# Patient Record
Sex: MALE | Race: BLACK OR AFRICAN AMERICAN | Employment: UNEMPLOYED | URBAN - METROPOLITAN AREA
[De-identification: names, ages, dates, MRNs, and addresses within clinical notes are randomized per-mention and may not be internally consistent; named-entity substitution may affect disease eponyms.]

---

## 2017-09-20 ENCOUNTER — HOSPITAL ENCOUNTER (EMERGENCY)
Age: 52
Discharge: HOME | End: 2017-09-20
Attending: EMERGENCY MEDICINE
Payer: MEDICAID

## 2017-09-20 ENCOUNTER — APPOINTMENT (OUTPATIENT)
Dept: GENERAL RADIOLOGY | Age: 52
End: 2017-09-20
Attending: EMERGENCY MEDICINE
Payer: MEDICAID

## 2017-09-20 VITALS
HEART RATE: 73 BPM | HEIGHT: 68 IN | DIASTOLIC BLOOD PRESSURE: 91 MMHG | SYSTOLIC BLOOD PRESSURE: 139 MMHG | RESPIRATION RATE: 16 BRPM | WEIGHT: 155 LBS | TEMPERATURE: 97.4 F | OXYGEN SATURATION: 98 % | BODY MASS INDEX: 23.49 KG/M2

## 2017-09-20 DIAGNOSIS — J06.9 VIRAL URI: ICD-10-CM

## 2017-09-20 DIAGNOSIS — J20.8 VIRAL BRONCHITIS: Primary | ICD-10-CM

## 2017-09-20 DIAGNOSIS — F17.200 SMOKER: ICD-10-CM

## 2017-09-20 DIAGNOSIS — R05.8 NON-PRODUCTIVE COUGH: ICD-10-CM

## 2017-09-20 LAB
ALBUMIN SERPL-MCNC: 4.3 G/DL (ref 3.4–5)
ALBUMIN/GLOB SERPL: 1 {RATIO} (ref 0.8–1.7)
ALP SERPL-CCNC: 59 U/L (ref 45–117)
ALT SERPL-CCNC: 82 U/L (ref 16–61)
ANION GAP SERPL CALC-SCNC: 9 MMOL/L (ref 3–18)
AST SERPL-CCNC: 66 U/L (ref 15–37)
ATRIAL RATE: 74 BPM
BASOPHILS # BLD: 0 K/UL (ref 0–0.06)
BASOPHILS NFR BLD: 0 % (ref 0–2)
BILIRUB DIRECT SERPL-MCNC: 0.2 MG/DL (ref 0–0.2)
BILIRUB SERPL-MCNC: 0.5 MG/DL (ref 0.2–1)
BUN SERPL-MCNC: 16 MG/DL (ref 7–18)
BUN/CREAT SERPL: 17 (ref 12–20)
CALCIUM SERPL-MCNC: 9.7 MG/DL (ref 8.5–10.1)
CALCULATED P AXIS, ECG09: 50 DEGREES
CALCULATED R AXIS, ECG10: -24 DEGREES
CALCULATED T AXIS, ECG11: -9 DEGREES
CHLORIDE SERPL-SCNC: 102 MMOL/L (ref 100–108)
CO2 SERPL-SCNC: 29 MMOL/L (ref 21–32)
CREAT SERPL-MCNC: 0.96 MG/DL (ref 0.6–1.3)
DIAGNOSIS, 93000: NORMAL
DIFFERENTIAL METHOD BLD: ABNORMAL
EOSINOPHIL # BLD: 0.4 K/UL (ref 0–0.4)
EOSINOPHIL NFR BLD: 7 % (ref 0–5)
ERYTHROCYTE [DISTWIDTH] IN BLOOD BY AUTOMATED COUNT: 13.5 % (ref 11.6–14.5)
GLOBULIN SER CALC-MCNC: 4.2 G/DL (ref 2–4)
GLUCOSE SERPL-MCNC: 108 MG/DL (ref 74–99)
HCT VFR BLD AUTO: 45.4 % (ref 36–48)
HGB BLD-MCNC: 16 G/DL (ref 13–16)
LIPASE SERPL-CCNC: 254 U/L (ref 73–393)
LYMPHOCYTES # BLD: 1.8 K/UL (ref 0.9–3.6)
LYMPHOCYTES NFR BLD: 31 % (ref 21–52)
MCH RBC QN AUTO: 34.3 PG (ref 24–34)
MCHC RBC AUTO-ENTMCNC: 35.2 G/DL (ref 31–37)
MCV RBC AUTO: 97.4 FL (ref 74–97)
MONOCYTES # BLD: 0.5 K/UL (ref 0.05–1.2)
MONOCYTES NFR BLD: 8 % (ref 3–10)
NEUTS SEG # BLD: 3.2 K/UL (ref 1.8–8)
NEUTS SEG NFR BLD: 54 % (ref 40–73)
P-R INTERVAL, ECG05: 190 MS
PLATELET # BLD AUTO: 217 K/UL (ref 135–420)
PMV BLD AUTO: 9.7 FL (ref 9.2–11.8)
POTASSIUM SERPL-SCNC: 4.3 MMOL/L (ref 3.5–5.5)
PROT SERPL-MCNC: 8.5 G/DL (ref 6.4–8.2)
Q-T INTERVAL, ECG07: 360 MS
QRS DURATION, ECG06: 88 MS
QTC CALCULATION (BEZET), ECG08: 399 MS
RBC # BLD AUTO: 4.66 M/UL (ref 4.7–5.5)
SODIUM SERPL-SCNC: 140 MMOL/L (ref 136–145)
VENTRICULAR RATE, ECG03: 74 BPM
WBC # BLD AUTO: 5.9 K/UL (ref 4.6–13.2)

## 2017-09-20 PROCEDURE — 96361 HYDRATE IV INFUSION ADD-ON: CPT

## 2017-09-20 PROCEDURE — 80048 BASIC METABOLIC PNL TOTAL CA: CPT | Performed by: EMERGENCY MEDICINE

## 2017-09-20 PROCEDURE — 93005 ELECTROCARDIOGRAM TRACING: CPT

## 2017-09-20 PROCEDURE — 74011250637 HC RX REV CODE- 250/637: Performed by: EMERGENCY MEDICINE

## 2017-09-20 PROCEDURE — 80076 HEPATIC FUNCTION PANEL: CPT | Performed by: EMERGENCY MEDICINE

## 2017-09-20 PROCEDURE — 99284 EMERGENCY DEPT VISIT MOD MDM: CPT

## 2017-09-20 PROCEDURE — 74011250636 HC RX REV CODE- 250/636: Performed by: EMERGENCY MEDICINE

## 2017-09-20 PROCEDURE — 83690 ASSAY OF LIPASE: CPT | Performed by: EMERGENCY MEDICINE

## 2017-09-20 PROCEDURE — 71020 XR CHEST PA LAT: CPT

## 2017-09-20 PROCEDURE — 96374 THER/PROPH/DIAG INJ IV PUSH: CPT

## 2017-09-20 PROCEDURE — 85025 COMPLETE CBC W/AUTO DIFF WBC: CPT | Performed by: EMERGENCY MEDICINE

## 2017-09-20 RX ORDER — IBUPROFEN 400 MG/1
400 TABLET ORAL
Status: COMPLETED | OUTPATIENT
Start: 2017-09-20 | End: 2017-09-20

## 2017-09-20 RX ORDER — ALBUTEROL SULFATE 90 UG/1
2 AEROSOL, METERED RESPIRATORY (INHALATION)
Qty: 1 INHALER | Refills: 0 | Status: SHIPPED | OUTPATIENT
Start: 2017-09-20

## 2017-09-20 RX ORDER — ACETAMINOPHEN 500 MG
1000 TABLET ORAL
Status: COMPLETED | OUTPATIENT
Start: 2017-09-20 | End: 2017-09-20

## 2017-09-20 RX ORDER — ONDANSETRON 2 MG/ML
4 INJECTION INTRAMUSCULAR; INTRAVENOUS
Status: COMPLETED | OUTPATIENT
Start: 2017-09-20 | End: 2017-09-20

## 2017-09-20 RX ORDER — DEXAMETHASONE 4 MG/1
10 TABLET ORAL ONCE
Status: COMPLETED | OUTPATIENT
Start: 2017-09-20 | End: 2017-09-20

## 2017-09-20 RX ADMIN — IBUPROFEN 400 MG: 400 TABLET, FILM COATED ORAL at 13:50

## 2017-09-20 RX ADMIN — SODIUM CHLORIDE 1000 ML: 900 INJECTION, SOLUTION INTRAVENOUS at 14:00

## 2017-09-20 RX ADMIN — ACETAMINOPHEN 1000 MG: 500 TABLET ORAL at 13:50

## 2017-09-20 RX ADMIN — DEXAMETHASONE 10 MG: 4 TABLET ORAL at 15:19

## 2017-09-20 RX ADMIN — ONDANSETRON 4 MG: 2 INJECTION INTRAMUSCULAR; INTRAVENOUS at 14:00

## 2017-09-20 NOTE — ED PROVIDER NOTES
[address] [facility] [facility]  EMERGENCY DEPARTMENT HISTORY AND PHYSICAL EXAM       Date: 9/20/2017   Patient Name: Karle Fabry   YOB: 1965  Medical Record Number: [id number]    HISTORY OF PRESENTING ILLNESS:     Chief Complaint   Patient presents with    Cough    Generalized Body Aches        History Provided By:  patient    Karle Fabry is a 45 y.o. male presenting with the noted PMH to the ED c/o non-productive cough x 1 week. The patient is also complaining of generalized body aches. He notes 1 episode of vomiting this morning and diarrhea 2 days ago. He reports that his right lung was removed due to a pneumothorax. The patient denies fever, CP, SOB, abd pain, complaints or concerns. The patient reports frequent EtOH consumption and smoking . 5 pack of cigarettes/ day. Primary Care Provider: None   Specialist:    Past Medical History:   Past Medical History:   Diagnosis Date    Hypertension         Past Surgical History:   Past Surgical History:   Procedure Laterality Date    CHEST SURGERY PROCEDURE UNLISTED      R lung removed    HX OTHER SURGICAL      [place]        Social History:   Social History   Substance Use Topics    Smoking status: Current Every Day Smoker     Packs/day: 0.50    Smokeless tobacco: Never Used    Alcohol use 54.0 oz/week     90 Cans of beer per week        Allergies: Allergies   Allergen Reactions    Haldol [Haloperidol Lactate] Swelling        REVIEW OF SYSTEMS:  Review of Systems   Constitutional: Negative for chills and fever. HENT: Negative for ear pain and sore throat. Eyes: Negative for pain and visual disturbance. Respiratory: Positive for cough. Negative for shortness of breath. Cardiovascular: Negative for chest pain and palpitations. Gastrointestinal: Positive for diarrhea, nausea and vomiting. Negative for abdominal pain. Genitourinary: Negative for flank pain. Musculoskeletal: Positive for myalgias.  Negative for back pain and neck pain. Neurological: Negative for syncope and headaches. Psychiatric/Behavioral: Negative for agitation. The patient is not nervous/anxious. PHYSICAL EXAM:  Vitals:    09/20/17 1305 09/20/17 1522   BP: (!) 134/94 (!) 139/91   Pulse: 88 73   Resp: 14 16   Temp: 97.4 °F (36.3 °C)    SpO2: 98%    Weight: 70.3 kg (155 lb)    Height: 5' 7.5\" (1.715 m)        Physical Exam   Constitutional: He is oriented to person, place, and time. He appears well-developed and well-nourished. HENT:   Head: Normocephalic and atraumatic. Mouth/Throat: Oropharynx is clear and moist.   Eyes: Pupils are equal, round, and reactive to light. No scleral icterus. Neck: Neck supple. No tracheal deviation present. Cardiovascular: Regular rhythm. No murmur heard. Pulmonary/Chest: Effort normal and breath sounds normal. No respiratory distress. He has no wheezes. Slightly coarse bilaterally   Abdominal: Soft. There is no tenderness. Musculoskeletal: Normal range of motion. He exhibits no deformity. Neurological: He is alert and oriented to person, place, and time. No gross neuro deficit   Skin: Skin is warm and dry. No rash noted. He is not diaphoretic. Psychiatric: He has a normal mood and affect. His behavior is normal.   Nursing note and vitals reviewed.         Medications   sodium chloride 0.9 % bolus infusion 1,000 mL (0 mL IntraVENous IV Completed 9/20/17 1500)   ondansetron (ZOFRAN) injection 4 mg (4 mg IntraVENous Given 9/20/17 1400)   acetaminophen (TYLENOL) tablet 1,000 mg (1,000 mg Oral Given 9/20/17 1350)   ibuprofen (MOTRIN) tablet 400 mg (400 mg Oral Given 9/20/17 1350)   dexamethasone (DECADRON) tablet 10 mg (10 mg Oral Given 9/20/17 1519)       RESULTS:    Labs -   Labs Reviewed   CBC WITH AUTOMATED DIFF - Abnormal; Notable for the following:        Result Value    RBC 4.66 (*)     MCV 97.4 (*)     MCH 34.3 (*)     EOSINOPHILS 7 (*)     All other components within normal limits METABOLIC PANEL, BASIC - Abnormal; Notable for the following:     Glucose 108 (*)     All other components within normal limits   HEPATIC FUNCTION PANEL - Abnormal; Notable for the following:     Protein, total 8.5 (*)     Globulin 4.2 (*)     AST (SGOT) 66 (*)     ALT (SGPT) 82 (*)     All other components within normal limits   LIPASE       Radiologic Studies -  Xr Chest Pa Lat    Result Date: 9/20/2017  EXAM: Two-view chest CLINICAL HISTORY: SOB COMPARISON: None FINDINGS: Frontal and lateral views of the chest demonstrate clear lungs. Cardiac, mediastinal and hilar contours are normal. No acute bony or soft tissue abnormality. Deformity of the right posterior and lateral seventh rib, could be postsurgical with what appear to be small surgical clips within the right anterior mediastinum. IMPRESSION: 1. No acute pulmonary process. EKG interpretation:  Time 1353 NSR, rate 74, left axis deviation, intervals within normal limits, isolated nonspecific ST elevation in V2, isolated nonspecific T-wave inversion in lead 3, no prior EKG for comparison    MEDICAL DECISION MAKING    DDX: viral symndrome, URI, pneumonia, underlying COPD    45 y.o. male with noted past medical history who presented with non-productive cough and generalized body aches x 1 week. Initial vitals were notable for mild hypertension. PE was notable for no significant abnormalities. The differential above was considered. Pt was given Zofran, Tylenol, Motrin, decadron, and fluids in the ED. Diagnostic studies were discussed with the patient and/or family and notable for no significant abnormalities. On re-evaluation, the patient resting comfortably. Presentation consistent with viral URI with bronchitis. Given Rx for albuterol inhaler and smoking cessation counseling. Patient has no new complaints, changes, or physical findings. Results were reviewed with the patient. Pt's questions and concerns were addressed.  Care plan was outlined, including follow-up with PCP/specialist and return precautions were discussed. Patient is felt to be stable for discharge at this time. Diagnosis   Clinical Impression:   1. Viral bronchitis    2. Non-productive cough    3. Viral URI    4. Smoker           Follow-up Information     Follow up With Details Comments Contact Info    [facility] EMERGENCY DEPT  If symptoms worsen [address] [place] [address] [address] Ortega, DO Schedule an appointment as soon as possible for a visit Please make an appointment to establish primary care [facility] [address]  [phone]            Discharge Medication List as of 9/20/2017  2:59 PM      START taking these medications    Details   albuterol (PROVENTIL HFA, VENTOLIN HFA, PROAIR HFA) 90 mcg/actuation inhaler Take 2 Puffs by inhalation every four (4) hours as needed for Wheezing., Print, Disp-1 Inhaler, R-0             _______________________________   Attestations:     Scribe Attestation      Sukhdev Mendoza acting as a scribe for and in the presence of Danilo Cheng DO      September 20, 2017 at [facility] [place] PM       Provider Attestation:      I personally performed the services described in the documentation, reviewed the documentation, as recorded by the scribe in my presence, and it accurately and completely records my words and actions.  September 20, 2017 at 1:08 PM - Danilo Cheng DO

## 2017-09-20 NOTE — DISCHARGE INSTRUCTIONS
Cough: Care Instructions  Your Care Instructions  A cough is your body's response to something that bothers your throat or airways. Many things can cause a cough. You might cough because of a cold or the flu, bronchitis, or asthma. Smoking, postnasal drip, allergies, and stomach acid that backs up into your throat also can cause coughs. A cough is a symptom, not a disease. Most coughs stop when the cause, such as a cold, goes away. You can take a few steps at home to cough less and feel better. Follow-up care is a key part of your treatment and safety. Be sure to make and go to all appointments, and call your doctor if you are having problems. It's also a good idea to know your test results and keep a list of the medicines you take. How can you care for yourself at home? · Drink lots of water and other fluids. This helps thin the mucus and soothes a dry or sore throat. Honey or lemon juice in hot water or tea may ease a dry cough. · Take cough medicine as directed by your doctor. · Prop up your head on pillows to help you breathe and ease a dry cough. · Try cough drops to soothe a dry or sore throat. Cough drops don't stop a cough. Medicine-flavored cough drops are no better than candy-flavored drops or hard candy. · Do not smoke. Avoid secondhand smoke. If you need help quitting, talk to your doctor about stop-smoking programs and medicines. These can increase your chances of quitting for good. When should you call for help? Call 911 anytime you think you may need emergency care. For example, call if:  · You have severe trouble breathing. Call your doctor now or seek immediate medical care if:  · You cough up blood. · You have new or worse trouble breathing. · You have a new or higher fever. · You have a new rash.   Watch closely for changes in your health, and be sure to contact your doctor if:  · You cough more deeply or more often, especially if you notice more mucus or a change in the color of your mucus. · You have new symptoms, such as a sore throat, an earache, or sinus pain. · You do not get better as expected. Where can you learn more? Go to http://puneet-alberto.info/. Enter D279 in the search box to learn more about \"Cough: Care Instructions. \"  Current as of: March 25, 2017  Content Version: 11.3  © 5830-4267 [employer]. Care instructions adapted under license by [employer]Ã¢â€žÂ¢ (which disclaims liability or warranty for this information). If you have questions about a medical condition or this instruction, always ask your healthcare professional. [address] any warranty or liability for your use of this information.

## 2018-12-17 ENCOUNTER — OFFICE VISIT (OUTPATIENT)
Dept: HEMATOLOGY | Age: 53
End: 2018-12-17

## 2018-12-17 ENCOUNTER — HOSPITAL ENCOUNTER (OUTPATIENT)
Dept: LAB | Age: 53
Discharge: HOME | End: 2018-12-17
Payer: COMMERCIAL

## 2018-12-17 VITALS
WEIGHT: 166 LBS | HEIGHT: 68 IN | SYSTOLIC BLOOD PRESSURE: 121 MMHG | TEMPERATURE: 99.1 F | BODY MASS INDEX: 25.16 KG/M2 | HEART RATE: 61 BPM | RESPIRATION RATE: 16 BRPM | DIASTOLIC BLOOD PRESSURE: 68 MMHG | OXYGEN SATURATION: 98 %

## 2018-12-17 DIAGNOSIS — B18.2 CHRONIC HEPATITIS C WITHOUT HEPATIC COMA (HCC): ICD-10-CM

## 2018-12-17 DIAGNOSIS — B18.2 CHRONIC HEPATITIS C WITHOUT HEPATIC COMA (HCC): Primary | ICD-10-CM

## 2018-12-17 PROBLEM — J93.83 SPONTANEOUS PNEUMOTHORAX: Status: ACTIVE | Noted: 2018-12-17

## 2018-12-17 PROBLEM — Z98.890 H/O PNEUMONECTOMY: Status: ACTIVE | Noted: 2018-12-17

## 2018-12-17 PROBLEM — K21.9 GERD (GASTROESOPHAGEAL REFLUX DISEASE): Status: ACTIVE | Noted: 2018-12-17

## 2018-12-17 PROBLEM — Z90.2 H/O PNEUMONECTOMY: Status: ACTIVE | Noted: 2018-12-17

## 2018-12-17 PROBLEM — I21.9 MYOCARDIAL INFARCTION (HCC): Status: ACTIVE | Noted: 2018-12-17

## 2018-12-17 LAB
ALBUMIN SERPL-MCNC: 3.9 G/DL (ref 3.4–5)
ALBUMIN/GLOB SERPL: 1.2 {RATIO} (ref 0.8–1.7)
ALP SERPL-CCNC: 51 U/L (ref 45–117)
ALT SERPL-CCNC: 98 U/L (ref 16–61)
ANION GAP SERPL CALC-SCNC: 3 MMOL/L (ref 3–18)
AST SERPL-CCNC: 47 U/L (ref 15–37)
BASOPHILS # BLD: 0 K/UL (ref 0–0.1)
BASOPHILS NFR BLD: 1 % (ref 0–2)
BILIRUB DIRECT SERPL-MCNC: 0.1 MG/DL (ref 0–0.2)
BILIRUB SERPL-MCNC: 0.4 MG/DL (ref 0.2–1)
BUN SERPL-MCNC: 14 MG/DL (ref 7–18)
BUN/CREAT SERPL: 14
CALCIUM SERPL-MCNC: 8.8 MG/DL (ref 8.5–10.1)
CHLORIDE SERPL-SCNC: 109 MMOL/L (ref 100–108)
CO2 SERPL-SCNC: 31 MMOL/L (ref 21–32)
CREAT SERPL-MCNC: 0.99 MG/DL (ref 0.6–1.3)
DIFFERENTIAL METHOD BLD: ABNORMAL
EOSINOPHIL # BLD: 0.5 K/UL (ref 0–0.4)
EOSINOPHIL NFR BLD: 12 % (ref 0–5)
ERYTHROCYTE [DISTWIDTH] IN BLOOD BY AUTOMATED COUNT: 14.4 % (ref 11.6–14.5)
FERRITIN SERPL-MCNC: 76 NG/ML (ref 8–388)
GLOBULIN SER CALC-MCNC: 3.2 G/DL (ref 2–4)
GLUCOSE SERPL-MCNC: 76 MG/DL (ref 74–99)
HCT VFR BLD AUTO: 44 % (ref 36–48)
HGB BLD-MCNC: 14.6 G/DL (ref 13–16)
IRON SATN MFR SERPL: 44 %
IRON SERPL-MCNC: 171 UG/DL (ref 50–175)
LYMPHOCYTES # BLD: 2.1 K/UL (ref 0.9–3.6)
LYMPHOCYTES NFR BLD: 48 % (ref 21–52)
MCH RBC QN AUTO: 32.2 PG (ref 24–34)
MCHC RBC AUTO-ENTMCNC: 33.2 G/DL (ref 31–37)
MCV RBC AUTO: 97.1 FL (ref 74–97)
MONOCYTES # BLD: 0.4 K/UL (ref 0.05–1.2)
MONOCYTES NFR BLD: 9 % (ref 3–10)
NEUTS SEG # BLD: 1.3 K/UL (ref 1.8–8)
NEUTS SEG NFR BLD: 30 % (ref 40–73)
PLATELET # BLD AUTO: 194 K/UL (ref 135–420)
PMV BLD AUTO: 9.8 FL (ref 9.2–11.8)
POTASSIUM SERPL-SCNC: 4 MMOL/L (ref 3.5–5.5)
PROT SERPL-MCNC: 7.1 G/DL (ref 6.4–8.2)
RBC # BLD AUTO: 4.53 M/UL (ref 4.7–5.5)
SODIUM SERPL-SCNC: 143 MMOL/L (ref 136–145)
TIBC SERPL-MCNC: 390 UG/DL (ref 250–450)
WBC # BLD AUTO: 4.4 K/UL (ref 4.6–13.2)

## 2018-12-17 PROCEDURE — 85025 COMPLETE CBC W/AUTO DIFF WBC: CPT

## 2018-12-17 PROCEDURE — 82728 ASSAY OF FERRITIN: CPT

## 2018-12-17 PROCEDURE — 86704 HEP B CORE ANTIBODY TOTAL: CPT

## 2018-12-17 PROCEDURE — 80048 BASIC METABOLIC PNL TOTAL CA: CPT

## 2018-12-17 PROCEDURE — 86706 HEP B SURFACE ANTIBODY: CPT

## 2018-12-17 PROCEDURE — 36415 COLL VENOUS BLD VENIPUNCTURE: CPT

## 2018-12-17 PROCEDURE — 80076 HEPATIC FUNCTION PANEL: CPT

## 2018-12-17 PROCEDURE — 87902 NFCT AGT GNTYP ALYS HEP C: CPT

## 2018-12-17 PROCEDURE — 86708 HEPATITIS A ANTIBODY: CPT

## 2018-12-17 PROCEDURE — 83540 ASSAY OF IRON: CPT

## 2018-12-17 PROCEDURE — 87521 HEPATITIS C PROBE&RVRS TRNSC: CPT

## 2018-12-17 PROCEDURE — 87340 HEPATITIS B SURFACE AG IA: CPT

## 2018-12-17 RX ORDER — RANITIDINE 150 MG/1
CAPSULE ORAL
COMMUNITY
Start: 2018-12-03 | End: 2019-01-29

## 2018-12-17 RX ORDER — AMOXICILLIN 500 MG/1
CAPSULE ORAL
Refills: 0 | COMMUNITY
Start: 2018-12-06 | End: 2019-01-29

## 2018-12-17 RX ORDER — IBUPROFEN 800 MG/1
TABLET ORAL
Refills: 0 | COMMUNITY
Start: 2018-12-06 | End: 2019-01-29

## 2018-12-17 RX ORDER — OMEPRAZOLE 40 MG/1
CAPSULE, DELAYED RELEASE ORAL
COMMUNITY
Start: 2018-12-03 | End: 2019-01-29

## 2018-12-17 RX ORDER — ALBUTEROL SULFATE 90 UG/1
AEROSOL, METERED RESPIRATORY (INHALATION)
COMMUNITY
End: 2019-01-29

## 2018-12-17 NOTE — PROGRESS NOTES
[address] [facility] [address], [place], [address] [address], [place], [place]       Eliana Adkins, RITA Bass, LILLY Gilmore, [facility]   Kristine Dykes, RITA Oropeza, RITA Mays [facility] [address]    at [address], [address] Barry Leonardo  22.    [phone]    FAX: [address] [place] [address], [address], [address]    [phone]    FAX: [phone]           Patient Care Team:  Brooke Ocampo DO as PCP - General (Family Practice)      Problem List  Date Reviewed: 12/17/2018          Codes Class Noted    Myocardial infarction [facility]) ICD-10-CM: I21.9  ICD-9-CM: 410.90  12/17/2018    Overview Signed 12/17/2018  1:48 PM by Fabiola William MD     2015             Chronic hepatitis C ([facility] [address] ICD-10-CM: B18.2  ICD-9-CM: 070.54  12/17/2018        GERD (gastroesophageal reflux disease) ICD-10-CM: K21.9  ICD-9-CM: 530.81  12/17/2018        H/O pneumonectomy ICD-10-CM: Z54.003, Z90.2  ICD-9-CM: V45.76  12/17/2018        Spontaneous pneumothorax ICD-10-CM: J93.83  ICD-9-CM: 512.89  12/17/2018                The clinicians listed above have asked me to see Mayelin Vallecillo in consultation regarding chronic HCV and its management. All medical records sent by the referring physicians were reviewed     The patient is a 52 y.o. Black male who was noted to have abnormalities in liver chemistries and subsequently tested positive for chronic HCV in 10/2018. Risk factors for acquiring HCV are IV drug use in 1980s. There was no history of acute icteric hepatitis at the time of these risk factors. Imaging of the liver was not performed. An assessment of liver fibrosis with biopsy or elastography has not been performed.       The patient has never received treatment for chronic HCV. The patient notes pain in the right side over the liver,     The patient has not experienced fatigue,     The patient completes all daily activities without any functional limitations. ASSESSMENT AND PLAN:  Chronic HCV   Chronic HCV of unclear severity. Have performed laboratory testing to monitor liver function and degree of liver injury. This included BMP, hepatic panel, CBC with platelet count,   Liver transaminases are elevated. ALP is normal.  Liver function is normal.  The platelet count is normal.      Will perform and/or review results of HCV viral load, HCV genotype to define the specific treatment and duration of treatment that will be required. Will perform  serologic and virologic studies to assess for other causes of chronic liver disease. Will perform imaging of the liver with ultrasound. The need to assess liver fibrosis was discussed. Sheer wave elastography can assess liver fibrosis and determine if a patient has advanced fibrosis or cirrhosis without the need for liver biopsy. Sheer wave elastography is now available at [facility]. This will be scheduled. The patient has not been treated for HCV. The patient has HCV genotype 1B. Discussed the treatment alternatives. The SVR/cure rate for HCV now exceeds 97% without significant side effects for most patients with HCV. The specific treatment is dependent upon genotype, viral load and histology. The patient should be treated with Harvoni (sofasbuvir and ledipasvir), Mavyret (glecaprevir and piprentasvir) or Epclusa (sofosbuvir and valpitasvir). The SVR/cure rate for is over 95%. Screening for Hepatocellular Carcinoma  HCC screening is not necessary if the patient has no evidence of cirrhosis.     Treatment of other medical problems in patients with chronic liver disease  There are no contraindications for the patient to take any medications that are necessary for treatment of other medical issues. The patient does not consume alcohol daily. Normal doses of acetaminophen can be used for pain as needed. Normal doses of acetaminophen as recommended on the label are not hepatotoxic, even in patients with cirrhosis. Counseling for alcohol in patients with chronic liver disease  The patient was counseled regarding alcohol consumption and the effect of alcohol on chronic liver disease. The patient does not consume any significant amount of alcohol. Drug use  The patient was counseled regarding the risk of overdose and death from using narcotic drugs and the risk of becoming reinfected with HCV once they are cured if they resume narcotic drug use. The patient has not used drugs since 1980s. Vaccinations   Vaccination for viral hepatitis A is recommended since the patient has no serologic evidence of previous exposure or vaccination with immunity. Vaccination for viral hepatitis B is not needed. The patient has serologic evidence of prior exposure or vaccination with immunity. Routine vaccinations against other bacterial and viral agents can be performed as indicated. Annual flu vaccination should be administered if indicated. ALLERGIES  No Known Allergies    MEDICATIONS  Current Outpatient Medications   Medication Sig    albuterol (PROVENTIL HFA, VENTOLIN HFA, PROAIR HFA) 90 mcg/actuation inhaler Take  by inhalation.  omeprazole (PRILOSEC) 40 mg capsule Take  by mouth.  raNITIdine hcl 150 mg capsule Take  by mouth.  ibuprofen (MOTRIN) 800 mg tablet take 1 tablet by mouth every 8 hours if needed for pain    amoxicillin (AMOXIL) 500 mg capsule take 1 capsule by mouth three times a day until finished     No current facility-administered medications for this visit. SYSTEM REVIEW NOT RELATED TO LIVER DISEASE OR REVIEWED ABOVE:  Constitution systems: Negative for fever, chills, weight gain, weight loss.    Eyes: Negative for visual changes. ENT: Negative for sore throat, painful swallowing. Respiratory: Negative for cough, hemoptysis, SOB. Cardiology: Negative for chest pain, palpitations. GI:  Negative for constipation or diarrhea. : Negative for urinary frequency, dysuria, hematuria, nocturia. Skin: Negative for rash. Hematology: Negative for easy bruising, blood clots. Musculo-skelatal: Negative for back pain, muscle pain, weakness. Neurologic: Negative for headaches, dizziness, vertigo, memory problems not related to HE. Psychology: Negative for anxiety, depression. FAMILY HISTORY:  The father is  of alcoholic cirhrosis. The mother is  of lung cancer. There is no family history of liver disease. SOCIAL HISTORY:  The patient is . The Partner has not been tested for HCV   The patient has no children. The patient currently smokes half pack of tobacco daily. The patient consumes 3-4 alcoholic beverages per month. The patient is currently receiving disability. PHYSICAL EXAMINATION:  Visit Vitals  /68 (BP 1 Location: Right arm, BP Patient Position: Sitting)   Pulse 61   Temp 99.1 °F (37.3 °C) (Tympanic)   Resp 16   Ht 5' 7.5\" (1.715 m)   Wt 166 lb (75.3 kg)   SpO2 98%   BMI 25.62 kg/m²     General: No acute distress. Eyes: Sclera anicteric. ENT: No oral lesions. Thyroid normal.  Nodes: No adenopathy. Skin: No spider angiomata. No jaundice. No palmar erythema. Respiratory: Lungs clear to auscultation. Cardiovascular: Regular heart rate. No murmurs. No JVD. Abdomen: Soft non-tender. Liver size normal to percussion/palpation. Spleen not palpable. No obvious ascites. Extremities: No edema. No muscle wasting. No gross arthritic changes. Neurologic: Alert and oriented. Cranial nerves grossly intact. No asterixis.     LABORATORY STUDIES:  Liver [place] of [address] [place] [address] & Units 2018   WBC 4.6 - 13.2 K/uL 4.4 (L) 5.9   ANC 1.8 - 8.0 K/UL 1.3 (L) 3.2   HGB 13.0 - 16.0 g/dL 14.6 16.0    - 420 K/uL 194 217   AST 15 - 37 U/L 47 (H) 66 (H)   ALT 16 - 61 U/L 98 (H) 82 (H)   Alk Phos 45 - 117 U/L 51 59   Bili, Total 0.2 - 1.0 MG/DL 0.4 0.5   Bili, Direct 0.0 - 0.2 MG/DL 0.1 0.2   Albumin 3.4 - 5.0 g/dL 3.9 4.3   BUN 7.0 - 18 MG/DL 14 16   Creat 0.6 - 1.3 MG/DL 0.99 0.96   Na 136 - 145 mmol/L 143 140   K 3.5 - 5.5 mmol/L 4.0 4.3   Cl 100 - 108 mmol/L 109 (H) 102   CO2 21 - 32 mmol/L 31 29   Glucose 74 - 99 mg/dL 76 108 (H)     SEROLOGIES:  Serologies Latest Ref Rng & Units 12/17/2018   Hep A Ab, Total NEGATIVE   NEGATIVE   Hep B Surface Ag <1.00 Index <0.10   Hep B Surface Ag Interp NEG   NEGATIVE   Hep B Core Ab, Total NEGATIVE   NEGATIVE   Hep B Surface Ab >10.0 mIU/mL 181.77   Hep B Surface Ab Interp POS   POSITIVE   Hep C Genotype  1b   Ferritin 8 - 388 NG/ML 76   Iron % Saturation % 44     LIVER HISTOLOGY:  Not available or performed    ENDOSCOPIC PROCEDURES:  Not available or performed    RADIOLOGY:  Not available or performed    OTHER TESTING:  Not available or performed    FOLLOW-UP:  All of the issues listed above in the Assessment and Plan were discussed with the patient. All questions were answered. The patient expressed a clear understanding of the above. [facility] 87 in 4 weeks for elastography and to initiate HCV treatment.       Cathie Fowler [place]  [address]  [facility], [facility]  [address] Beatriz [place], [address]  12 [facility]

## 2018-12-17 NOTE — PROGRESS NOTES
Kelin Hannah is a 52 y.o. male      1. Have you been to the ER, urgent care clinic or hospitalized since your last visit? NO.     2. Have you seen or consulted any other health care providers outside of the [address] [place] [address] since your last visit (Include any pap smears or colon screening)?  NO          Learning Assessment 12/17/2018   PRIMARY LEARNER Patient   BARRIERS PRIMARY LEARNER NONE   CO-LEARNER CAREGIVER No   PRIMARY LANGUAGE ENGLISH   LEARNER PREFERENCE PRIMARY LISTENING   ANSWERED BY PATIENT   RELATIONSHIP SELF

## 2018-12-18 LAB
HAV AB SER QL IA: NEGATIVE
HBV CORE AB SERPL QL IA: NEGATIVE
HBV SURFACE AB SER QL IA: POSITIVE
HBV SURFACE AB SERPL IA-ACNC: 181.77 MIU/ML
HBV SURFACE AG SER QL: <0.1 INDEX
HBV SURFACE AG SER QL: NEGATIVE
HEP BS AB COMMENT,HBSAC: NORMAL

## 2018-12-21 LAB
HCV RNA SERPL NAA+PROBE-LOG IU: 7.27 HCV LOG 10IU/ML
HCV RNA SERPL PROBE AMP-ACNC: ABNORMAL HCVIU/ML
HCV RNA SERPL QL NAA+PROBE: POSITIVE

## 2018-12-23 LAB
HCV GENTYP SERPL NAA+PROBE: NORMAL
PLEASE NOTE, 550474: NORMAL

## 2019-01-04 ENCOUNTER — HOSPITAL ENCOUNTER (OUTPATIENT)
Dept: ULTRASOUND IMAGING | Age: 54
Discharge: HOME | End: 2019-01-04
Attending: INTERNAL MEDICINE
Payer: MEDICAID

## 2019-01-04 DIAGNOSIS — B18.2 CHRONIC HEPATITIS C WITHOUT HEPATIC COMA (HCC): ICD-10-CM

## 2019-01-04 PROCEDURE — 76981 USE PARENCHYMA: CPT

## 2019-01-09 ENCOUNTER — APPOINTMENT (OUTPATIENT)
Dept: GENERAL RADIOLOGY | Age: 54
End: 2019-01-09
Attending: PHYSICIAN ASSISTANT
Payer: MEDICAID

## 2019-01-09 ENCOUNTER — HOSPITAL ENCOUNTER (EMERGENCY)
Age: 54
Discharge: HOME | End: 2019-01-09
Attending: EMERGENCY MEDICINE
Payer: MEDICAID

## 2019-01-09 VITALS
BODY MASS INDEX: 24.25 KG/M2 | WEIGHT: 160 LBS | RESPIRATION RATE: 20 BRPM | HEART RATE: 84 BPM | TEMPERATURE: 97.9 F | DIASTOLIC BLOOD PRESSURE: 80 MMHG | HEIGHT: 68 IN | SYSTOLIC BLOOD PRESSURE: 120 MMHG | OXYGEN SATURATION: 95 %

## 2019-01-09 DIAGNOSIS — R05.9 COUGH: Primary | ICD-10-CM

## 2019-01-09 PROCEDURE — 71046 X-RAY EXAM CHEST 2 VIEWS: CPT

## 2019-01-09 PROCEDURE — 99284 EMERGENCY DEPT VISIT MOD MDM: CPT

## 2019-01-09 PROCEDURE — 74011250637 HC RX REV CODE- 250/637: Performed by: EMERGENCY MEDICINE

## 2019-01-09 PROCEDURE — 77030029684 HC NEB SM VOL KT MONA -A

## 2019-01-09 PROCEDURE — 74011000250 HC RX REV CODE- 250: Performed by: PHYSICIAN ASSISTANT

## 2019-01-09 PROCEDURE — 94640 AIRWAY INHALATION TREATMENT: CPT

## 2019-01-09 RX ORDER — IPRATROPIUM BROMIDE AND ALBUTEROL SULFATE 2.5; .5 MG/3ML; MG/3ML
3 SOLUTION RESPIRATORY (INHALATION) ONCE
Status: COMPLETED | OUTPATIENT
Start: 2019-01-09 | End: 2019-01-09

## 2019-01-09 RX ORDER — HYDROCODONE POLISTIREX AND CHLORPHENIRAMINE POLISTIREX 10; 8 MG/5ML; MG/5ML
5 SUSPENSION, EXTENDED RELEASE ORAL
Qty: 60 ML | Refills: 0 | Status: SHIPPED | OUTPATIENT
Start: 2019-01-09 | End: 2019-01-29

## 2019-01-09 RX ORDER — BENZONATATE 100 MG/1
100 CAPSULE ORAL
Qty: 30 CAP | Refills: 0 | Status: SHIPPED | OUTPATIENT
Start: 2019-01-09 | End: 2019-01-16

## 2019-01-09 RX ORDER — BENZONATATE 100 MG/1
100 CAPSULE ORAL
Status: COMPLETED | OUTPATIENT
Start: 2019-01-09 | End: 2019-01-09

## 2019-01-09 RX ADMIN — BENZONATATE 100 MG: 100 CAPSULE ORAL at 18:36

## 2019-01-09 RX ADMIN — IPRATROPIUM BROMIDE AND ALBUTEROL SULFATE 3 ML: .5; 3 SOLUTION RESPIRATORY (INHALATION) at 17:09

## 2019-01-09 NOTE — DISCHARGE INSTRUCTIONS
Patient Education        Cough: Care Instructions  Your Care Instructions    A cough is your body's response to something that bothers your throat or airways. Many things can cause a cough. You might cough because of a cold or the flu, bronchitis, or asthma. Smoking, postnasal drip, allergies, and stomach acid that backs up into your throat also can cause coughs. A cough is a symptom, not a disease. Most coughs stop when the cause, such as a cold, goes away. You can take a few steps at home to cough less and feel better. Follow-up care is a key part of your treatment and safety. Be sure to make and go to all appointments, and call your doctor if you are having problems. It's also a good idea to know your test results and keep a list of the medicines you take. How can you care for yourself at home? · Drink lots of water and other fluids. This helps thin the mucus and soothes a dry or sore throat. Honey or lemon juice in hot water or tea may ease a dry cough. · Take cough medicine as directed by your doctor. · Prop up your head on pillows to help you breathe and ease a dry cough. · Try cough drops to soothe a dry or sore throat. Cough drops don't stop a cough. Medicine-flavored cough drops are no better than candy-flavored drops or hard candy. · Do not smoke. Avoid secondhand smoke. If you need help quitting, talk to your doctor about stop-smoking programs and medicines. These can increase your chances of quitting for good. When should you call for help? Call 911 anytime you think you may need emergency care.  For example, call if:    · You have severe trouble breathing.    Call your doctor now or seek immediate medical care if:    · You cough up blood.     · You have new or worse trouble breathing.     · You have a new or higher fever.     · You have a new rash.    Watch closely for changes in your health, and be sure to contact your doctor if:    · You cough more deeply or more often, especially if you notice more mucus or a change in the color of your mucus.     · You have new symptoms, such as a sore throat, an earache, or sinus pain.     · You do not get better as expected. Where can you learn more? Go to http://puneet-alberto.info/. Enter D279 in the search box to learn more about \"Cough: Care Instructions. \"  Current as of: December 6, 2017  Content Version: 11.8  © 0622-9473 [employer]. Care instructions adapted under license by [employer] (which disclaims liability or warranty for this information). If you have questions about a medical condition or this instruction, always ask your healthcare professional. [address] any warranty or liability for your use of this information.

## 2019-01-09 NOTE — ED PROVIDER NOTES
EMERGENCY DEPARTMENT HISTORY AND PHYSICAL EXAM 
 
5:50 PM 
 
 
Date: 1/9/2019 Patient Name: Kelin Hannah History of Presenting Illness Chief Complaint Patient presents with  Cough  Nasal Congestion  Headache History Provided By: Patient Chief Complaint: Cough Duration: 2 Weeks Timing:  Gradual 
Location: n/a Quality: Productive Severity: Moderate Modifying Factors: not relieved with steroids or albuterol pump Associated Symptoms: denies any other associated signs or symptoms Additional History (Context): Kelin Hannah is a 52 y.o. male with PMHx significant for hypertension and asthma who presents via taxi  with CC of a gradual productive cough that started 2 weeks ago. Pt reports that he went to [facility] 1 week ago where he was given an albuterol pump and steroids. Neither of these relieved his cough. He reports the cough is productive with yellow phlegm. No other symptoms or concerns were expressed. PCP: Krista Look, DO 
 
Current Outpatient Medications Medication Sig Dispense Refill  benzonatate (TESSALON PERLES) 100 mg capsule Take 1 Cap by mouth three (3) times daily as needed for Cough for up to 7 days. 30 Cap 0  chlorpheniramine-HYDROcodone (TUSSIONEX PENNKINETIC ER) 10-8 mg/5 mL suspension Take 5 mL by mouth every twelve (12) hours as needed for Cough. Max Daily Amount: 10 mL. 60 mL 0  
 albuterol (PROVENTIL HFA, VENTOLIN HFA, PROAIR HFA) 90 mcg/actuation inhaler Take  by inhalation.  omeprazole (PRILOSEC) 40 mg capsule Take  by mouth.  raNITIdine hcl 150 mg capsule Take  by mouth.     
 ibuprofen (MOTRIN) 800 mg tablet take 1 tablet by mouth every 8 hours if needed for pain  0  
 amoxicillin (AMOXIL) 500 mg capsule take 1 capsule by mouth three times a day until finished  0  
 albuterol (PROVENTIL HFA, VENTOLIN HFA, PROAIR HFA) 90 mcg/actuation inhaler Take 2 Puffs by inhalation every four (4) hours as needed for Wheezing. 1 Inhaler 0 Past History Past Medical History: 
Past Medical History:  
Diagnosis Date  Asthma  Hepatitis C   
 Hypertension Past Surgical History: 
Past Surgical History:  
Procedure Laterality Date  CHEST SURGERY PROCEDURE UNLISTED    
 R lung removed  HX OTHER SURGICAL    
 MI  
 HX OTHER SURGICAL Right 2000 RIGHT LUNG REMOVED Family History: 
History reviewed. No pertinent family history. Social History: 
Social History Tobacco Use  Smoking status: Light Tobacco Smoker  Smokeless tobacco: Never Used  Tobacco comment: 1 pack every 3 weeks Substance Use Topics  Alcohol use: Yes Comment: 3 Beers per  month  Drug use: No  
 
 
Allergies: Allergies Allergen Reactions  Haldol [Haloperidol Lactate] Swelling Review of Systems Review of Systems Constitutional: Negative for activity change, chills and fever. HENT: Negative for congestion, ear pain, sore throat and trouble swallowing. Eyes: Negative for visual disturbance. Respiratory: Positive for cough. Negative for shortness of breath and wheezing. Cardiovascular: Negative for chest pain, palpitations and leg swelling. Gastrointestinal: Negative for abdominal pain, diarrhea, nausea and vomiting. Genitourinary: Negative for decreased urine volume, dysuria, frequency and urgency. Musculoskeletal: Negative for arthralgias and joint swelling. Skin: Negative for rash. Neurological: Negative for weakness, numbness and headaches. Psychiatric/Behavioral: Negative for agitation and confusion. All other systems reviewed and are negative. Physical Exam  
 
Visit Vitals /78 (BP 1 Location: Right arm) Pulse 84 Temp 97.9 °F (36.6 °C) Resp 20 Ht 5' 7.5\" (1.715 m) Wt 72.6 kg (160 lb) SpO2 98% BMI 24.69 kg/m² Physical Exam  
Constitutional: He is oriented to person, place, and time.  He appears well-developed and well-nourished. He is cooperative. No distress. HENT:  
Head: Normocephalic and atraumatic. Mouth/Throat: Oropharynx is clear and moist. No oropharyngeal exudate. Eyes: Conjunctivae and EOM are normal. Right eye exhibits no discharge. Left eye exhibits no discharge. No scleral icterus. Neck: Normal range of motion and phonation normal. Neck supple. No JVD present. No thyromegaly present. Cardiovascular: Normal rate, regular rhythm, S1 normal, S2 normal, normal heart sounds and intact distal pulses. Exam reveals no gallop and no friction rub. No murmur heard. Pulmonary/Chest: Effort normal and breath sounds normal. No accessory muscle usage. No respiratory distress. He has no wheezes. He has no rhonchi. He has no rales. Thoracotomy scar on right side of chest, well healed. Abdominal: Soft. Normal appearance and bowel sounds are normal. He exhibits no distension and no pulsatile midline mass. There is no tenderness. There is no rebound and no guarding. Musculoskeletal: Normal range of motion. He exhibits no edema or deformity. Lymphadenopathy:  
     Head (right side): No submandibular adenopathy present. He has no cervical adenopathy. Neurological: He is alert and oriented to person, place, and time. Moves all extremities. No obvious focal deficits or facial asymmetry. Skin: Skin is warm and dry. No rash noted. Psychiatric: He has a normal mood and affect. His speech is normal and behavior is normal.  
Nursing note and vitals reviewed. Diagnostic Study Results Labs - No results found for this or any previous visit (from the past 12 hour(s)). Radiologic Studies - Xr Chest Pa Lat Result Date: 1/9/2019 EXAM: Chest series, 2 views 1/9/2019 INDICATION/HISTORY: Two-week history of cough with nasal congestion. COMPARISON: None. _______________ FINDINGS: The cardiomediastinal contours are within normal limits.   The lungs are clear. There is no pneumothorax or pleural effusion. _______________ IMPRESSION: No acute pulmonary disease. Medical Decision Making I am the first provider for this patient. I reviewed the vital signs, available nursing notes, past medical history, past surgical history, family history and social history. Vital Signs-Reviewed the patient's vital signs. Pulse Oximetry Analysis -  98% on room air (Interpretation) Cardiac Monitor: 
Rate: 84 bpm 
Rhythm:  Normal Sinus Rhythm Records Reviewed: Nursing Notes (Time of Review: 5:50 PM) Provider Notes (Medical Decision Making): ASSESSMENT / PLAN: 
    52y/o AAM, PMhx of HTN, Asthma/COPD (and RLL lobectomy), HCV, GERD who presents with persistent cough for ~2wks. Reports productive cough a few weeks ago, seen at [facility] last week, dx with Asthma exacerbation, given steroid burst for a few days and scheduled albuterol. Improving but now having persistent dry cough. No SOB/wheezing, no CP, no f/c, nonproductive cough. On exam, thin AAM, vitals normal ,pleasant but having dry cough thru nearly entire exam. HEENT, CV, Abd benign, no LE edema. Lungs w/well healed Right Thoracotomy scar, good air movement, no w/r/r, but has dry cough spells if breaths too deeply. CXR from triage completely normal. 
 
   Seems like hypersensitivity cough after his Asthma exacerbation or possibly viral URI last week. Nonproductive, no focal findings, no fever, etc. Well appearing. No wheezing or suggestion of continued asthma exacerbation.   
 
-Tessalon Pearles 100mg PO here, then Rx for same  
-Rx Tussionex Per [facility] cough syrup to use prn for next few days. -F/U with PCP 
-Return to ED and yproblems/concerns. Alicia Esteves MD 
EM-IM Physician Diagnosis Clinical Impression: 1. Cough Disposition:  Home Follow-up Information Follow up With Specialties Details Why Contact Info Karan Bhatia,  Family Practice Call in 1 day  [place] [address] [address] [facility] [address] [id number] 
[phone] [facility] EMERGENCY DEPT Emergency Medicine  As needed, If symptoms worsen [address] 
[phone] Medication List  
  
START taking these medications   
benzonatate 100 mg capsule Commonly known as:  TESSALON PERLES Take 1 Cap by mouth three (3) times daily as needed for Cough for up to 7 days. chlorpheniramine-HYDROcodone 10-8 mg/5 mL suspension Commonly known as:  Fanny Rousseau ER Take 5 mL by mouth every twelve (12) hours as needed for Cough. Max Daily Amount: 10 mL. CONTINUE taking these medications * albuterol 90 mcg/actuation inhaler Commonly known as:  PROVENTIL HFA, VENTOLIN HFA, PROAIR HFA * albuterol 90 mcg/actuation inhaler Commonly known as:  PROVENTIL HFA, VENTOLIN HFA, PROAIR HFA Take 2 Puffs by inhalation every four (4) hours as needed for Wheezing. amoxicillin 500 mg capsule Commonly known as:  AMOXIL 
  
ibuprofen 800 mg tablet Commonly known as:  MOTRIN 
  
omeprazole 40 mg capsule Commonly known as:  PRILOSEC 
  
raNITIdine hcl 150 mg capsule * This list has 2 medication(s) that are the same as other medications prescribed for you. Read the directions carefully, and ask your doctor or other care provider to review them with you. Where to Get Your Medications Information about where to get these medications is not yet available Ask your nurse or doctor about these medications · benzonatate 100 mg capsule · chlorpheniramine-HYDROcodone 10-8 mg/5 mL suspension 
  
 
_______________________________ Attestations: 
Scribe Attestation Lady Hammonds acting as a scribe for and in the presence of Rubi Serrato MD     
January 09, 2019 at 6:32 PM 
    
Provider Attestation:     
I personally performed the services described in the documentation, reviewed the documentation, as recorded by the scribe in my presence, and it accurately and completely records my words and actions. January 09, 2019 at 6:32 PM - Joy Pike MD   
__________________________ 
_____

## 2019-01-10 NOTE — ED NOTES
I have reviewed discharge instruction and prescriptions with pt. Pt verbalized understanding and has no further questions at this time. Education taught and patient verbalized understanding of education. Teach back method used. Armband removed and shredded per patients request.   
Patients pain 0/10. Belongings are with pt. Patient discharged with self to home.

## 2019-01-29 ENCOUNTER — HOSPITAL ENCOUNTER (OUTPATIENT)
Dept: LAB | Age: 54
Discharge: HOME | End: 2019-01-29
Payer: MEDICAID

## 2019-01-29 ENCOUNTER — OFFICE VISIT (OUTPATIENT)
Dept: HEMATOLOGY | Age: 54
End: 2019-01-29

## 2019-01-29 VITALS
TEMPERATURE: 97.8 F | RESPIRATION RATE: 15 BRPM | BODY MASS INDEX: 25.61 KG/M2 | WEIGHT: 169 LBS | DIASTOLIC BLOOD PRESSURE: 77 MMHG | HEART RATE: 66 BPM | HEIGHT: 68 IN | OXYGEN SATURATION: 98 % | SYSTOLIC BLOOD PRESSURE: 124 MMHG

## 2019-01-29 DIAGNOSIS — B18.2 CHRONIC HEPATITIS C WITHOUT HEPATIC COMA (HCC): Primary | ICD-10-CM

## 2019-01-29 DIAGNOSIS — B18.2 CHRONIC HEPATITIS C WITHOUT HEPATIC COMA (HCC): ICD-10-CM

## 2019-01-29 LAB — ETHANOL SERPL-MCNC: 4 MG/DL (ref 0–3)

## 2019-01-29 PROCEDURE — 80307 DRUG TEST PRSMV CHEM ANLYZR: CPT

## 2019-01-29 PROCEDURE — 36415 COLL VENOUS BLD VENIPUNCTURE: CPT

## 2019-01-29 NOTE — PROGRESS NOTES
[address] [facility], [place], [address], [place], [place]       RITA Winter PA-C Larene Pronto, [facility]-BC   RITA Harding NP [address] [facility] [address]    at [address], [address] [address]  22.    [phone]    FAX: [address]    at [address], [address], [address] [facility],[address]    [address] [facility] [address]: [phone]     Patient Care Team:  David Woods DO as PCP - General (Family Practice)  None      Problem List  Date Reviewed: 1/1/2019          Codes Class Noted    Myocardial infarction [facility]) ICD-10-CM: I21.9  ICD-9-CM: 410.90  12/17/2018    Overview Signed 12/17/2018  1:48 PM by Alyssa Tony MD     2015             Chronic hepatitis C ([facility] [address] ICD-10-CM: B18.2  ICD-9-CM: 070.54  12/17/2018        GERD (gastroesophageal reflux disease) ICD-10-CM: K21.9  ICD-9-CM: 530.81  12/17/2018        H/O pneumonectomy ICD-10-CM: U16.831, Z90.2  ICD-9-CM: V45.76  12/17/2018        Spontaneous pneumothorax ICD-10-CM: J93.83  ICD-9-CM: 512.89  12/17/2018              Jovanni Sender returns to the [address] for management of chronic HCV. The active problem list, all pertinent past medical history, medications, liver histology, radiologic findings, and laboratory findings related to the liver disorder were reviewed with the patient. The patient is a 52 y.o.  male who was noted to have abnormalities in liver chemistries and subsequently tested positive for chronic HCV in 10/2018. Risk factors for acquiring HCV are IV drug use in 1980s. There was no history of acute icteric hepatitis at the time of these risk factors.       The most recent imaging of the liver was Ultrasound performed in 1/2019. Results suggest that the liver is normal. No liver mass lesions noted. Assessment of liver fibrosis was performed with sheer wave elastography at THE [facility] in 1/2019. The result was E Mean: 5.66 kPa which correlates with portal fibrosis. The patient has never received treatment for chronic HCV. The patient notes pain in the right side over the liver. The patient has not experienced fatigue. The patient completes all daily activities without any functional limitations. ASSESSMENT AND PLAN:  Chronic HCV   Chronic HCV with stage I portal fibrosis. Have performed laboratory testing to monitor liver function and degree of liver injury. This included BMP, hepatic panel, CBC with platelet count. Liver transaminases are elevated. ALP is normal.  Liver function is normal.  The platelet count is normal.      Will perform and/or review results of HCV viral load, HCV genotype to define the specific treatment and duration of treatment that will be required. Serologic and virologic studies to assess for other causes of chronic liver disease were negative. The need to assess liver fibrosis was discussed. Sheer wave elastography can assess liver fibrosis and determine if a patient has advanced fibrosis or cirrhosis without the need for liver biopsy. Sheer wave elastography is now available at [facility]. The patient has not been treated for HCV. The patient has HCV genotype 1B. Discussed the treatment alternatives. The SVR/cure rate for HCV now exceeds 97% without significant side effects for most patients with HCV. The specific treatment is dependent upon genotype, viral load and histology. The patient should be treated with Mavyret (glecaprevir and piprentasvir) x 8 weeks. The SVR/cure rate for is over 95%. We will request pre-authorization for the HCV medication subject to the approval guidelines of the patient's insurance carrier.   If the patient is denied we may appeal on their behalf. I have explained to him that I will order the medications from the specialty pharmacy and they will be delivered to his home. He may begin taking the treatment medications as soon as they arrive. He is to call and make an appointment for treatment week #4 once he begins therapy. He voiced understanding of this plan. Screening for Hepatocellular Carcinoma  HCC screening is not necessary if the patient has no evidence of cirrhosis. Treatment of other medical problems in patients with chronic liver disease  There are no contraindications for the patient to take any medications that are necessary for treatment of other medical issues. The patient does not consume alcohol daily. Normal doses of acetaminophen can be used for pain as needed. Normal doses of acetaminophen as recommended on the label are not hepatotoxic, even in patients with cirrhosis. Counseling for alcohol in patients with chronic liver disease  The patient was counseled regarding alcohol consumption and the effect of alcohol on chronic liver disease. The patient does not consume any significant amount of alcohol. Drug use  The patient was counseled regarding the risk of overdose and death from using narcotic drugs and the risk of becoming reinfected with HCV once they are cured if they resume narcotic drug use. The patient has not used drugs since 1980s. Vaccinations   Vaccination for viral hepatitis A is recommended since the patient has no serologic evidence of previous exposure or vaccination with immunity. Vaccination for viral hepatitis B is not needed. The patient has serologic evidence of prior exposure or vaccination with immunity. Routine vaccinations against other bacterial and viral agents can be performed as indicated. Annual flu vaccination should be administered if indicated.       ALLERGIES  Allergies   Allergen Reactions    Haldol [Haloperidol Lactate] Swelling MEDICATIONS  Current Outpatient Medications   Medication Sig    albuterol (PROVENTIL HFA, VENTOLIN HFA, PROAIR HFA) 90 mcg/actuation inhaler Take 2 Puffs by inhalation every four (4) hours as needed for Wheezing. No current facility-administered medications for this visit. SYSTEM REVIEW NOT RELATED TO LIVER DISEASE OR REVIEWED ABOVE:  Constitution systems: Negative for fever, chills, weight gain, weight loss. Eyes: Negative for visual changes. ENT: Negative for sore throat, painful swallowing. Respiratory: Negative for cough, hemoptysis, SOB. Cardiology: Negative for chest pain, palpitations. GI:  Negative for constipation or diarrhea. : Negative for urinary frequency, dysuria, hematuria, nocturia. Skin: Negative for rash. Hematology: Negative for easy bruising, blood clots. Musculo-skelatal: Negative for back pain, muscle pain, weakness. Neurologic: Negative for headaches, dizziness, vertigo, memory problems not related to HE. Psychology: Negative for anxiety, depression. FAMILY HISTORY:  The father is  of alcoholic cirhrosis. The mother is  of lung cancer. There is no family history of liver disease. SOCIAL HISTORY:  The patient is . The Partner has not been tested for HCV   The patient has no children. The patient currently smokes half pack of tobacco daily. The patient consumes 3-4 alcoholic beverages per month. The patient is currently receiving disability. PHYSICAL EXAMINATION:  Visit Vitals  /77 (BP 1 Location: Right arm, BP Patient Position: Sitting)   Pulse 66   Temp 97.8 °F (36.6 °C)   Resp 15   Ht 5' 7.5\" (1.715 m)   Wt 169 lb (76.7 kg)   SpO2 98%   BMI 26.08 kg/m²     General: No acute distress. Eyes: Sclera anicteric. ENT: No oral lesions. Thyroid normal.  Nodes: No adenopathy. Skin: No spider angiomata. No jaundice. No palmar erythema. Respiratory: Lungs clear to auscultation.    Cardiovascular: Regular heart rate. No murmurs. No JVD. Abdomen: Soft non-tender. Liver size normal to percussion/palpation. Spleen not palpable. No obvious ascites. Extremities: No edema. No muscle wasting. No gross arthritic changes. Neurologic: Alert and oriented. Cranial nerves grossly intact. No asterixis. LABORATORY STUDIES:  Liver [place] of [address] Units 12/17/2018 9/20/2017   WBC 4.6 - 13.2 K/uL 4.4 (L) 5.9   ANC 1.8 - 8.0 K/UL 1.3 (L) 3.2   HGB 13.0 - 16.0 g/dL 14.6 16.0    - 420 K/uL 194 217   AST 15 - 37 U/L 47 (H) 66 (H)   ALT 16 - 61 U/L 98 (H) 82 (H)   Alk Phos 45 - 117 U/L 51 59   Bili, Total 0.2 - 1.0 MG/DL 0.4 0.5   Bili, Direct 0.0 - 0.2 MG/DL 0.1 0.2   Albumin 3.4 - 5.0 g/dL 3.9 4.3   BUN 7.0 - 18 MG/DL 14 16   Creat 0.6 - 1.3 MG/DL 0.99 0.96   Na 136 - 145 mmol/L 143 140   K 3.5 - 5.5 mmol/L 4.0 4.3   Cl 100 - 108 mmol/L 109 (H) 102   CO2 21 - 32 mmol/L 31 29   Glucose 74 - 99 mg/dL 76 108 (H)     SEROLOGIES:  Serologies Latest Ref Rng & Units 12/17/2018   Hep A Ab, Total NEGATIVE   NEGATIVE   Hep B Surface Ag <1.00 Index <0.10   Hep B Surface Ag Interp NEG   NEGATIVE   Hep B Core Ab, Total NEGATIVE   NEGATIVE   Hep B Surface Ab >10.0 mIU/mL 181.77   Hep B Surface Ab Interp POS   POSITIVE   Hep C Genotype  1b   Ferritin 8 - 388 NG/ML 76   Iron % Saturation % 44     LIVER HISTOLOGY:  1/2019. Sheer wave elastography performed at THE [facility]. EkPa was E Range: 3.53- 7.12 kPa, E Mean: 5.66 kPa, E Median: 5.91 kPa, E Std: 1.10 kPa. The results suggested a fibrosis level of F1.    ENDOSCOPIC PROCEDURES:  Not available or performed    RADIOLOGY:  1/2019. Ultrasound of liver. Normal appearing liver. No liver mass lesions. OTHER TESTING:  Not available or performed    FOLLOW-UP:  All of the issues listed above in the Assessment and Plan were discussed with the patient. All questions were answered. The patient expressed a clear understanding of the above.     Return to [facility] [place] for monitoring of HCV treatment in 4 weeks after starting medication      Emeli Murray, AGPCNP-BC  [address] of [employer]  [address] [place] [address], [id number] [place] [address], [address]  [phone]

## 2019-01-29 NOTE — PROGRESS NOTES
1. Have you been to the ER, urgent care clinic since your last visit? Hospitalized since your last visit? 01/2019 Coleen FAULKNER    2. Have you seen or consulted any other health care providers outside of the [address] [place] [address] since your last visit? Include any pap smears or colon screening.  No

## 2019-01-31 LAB
AMPHETAMINES SERPL QL SCN: NEGATIVE NG/ML
BARBITURATES SERPL QL SCN: NEGATIVE UG/ML
CANNABINOIDS SERPL QL SCN: NEGATIVE NG/ML
COCAINE+BZE SERPL QL SCN: NEGATIVE NG/ML
OPIATES SERPL QL SCN: NEGATIVE NG/ML
OXYCODONE, 790407: NEGATIVE NG/ML
PCP SERPL QL SCN: NEGATIVE NG/ML

## 2019-02-05 DIAGNOSIS — B18.2 CHRONIC HEPATITIS C WITHOUT HEPATIC COMA (HCC): Primary | ICD-10-CM

## 2019-02-18 ENCOUNTER — HOSPITAL ENCOUNTER (OUTPATIENT)
Dept: LAB | Age: 54
Discharge: HOME | End: 2019-02-18
Payer: MEDICAID

## 2019-02-18 DIAGNOSIS — B18.2 CHRONIC HEPATITIS C WITHOUT HEPATIC COMA (HCC): ICD-10-CM

## 2019-02-18 PROCEDURE — 36415 COLL VENOUS BLD VENIPUNCTURE: CPT

## 2019-02-18 PROCEDURE — 87340 HEPATITIS B SURFACE AG IA: CPT

## 2019-02-18 PROCEDURE — 86704 HEP B CORE ANTIBODY TOTAL: CPT

## 2019-02-18 PROCEDURE — 86706 HEP B SURFACE ANTIBODY: CPT

## 2019-02-18 PROCEDURE — 87389 HIV-1 AG W/HIV-1&-2 AB AG IA: CPT

## 2019-02-19 LAB
HBV CORE AB SERPL QL IA: NEGATIVE
HBV SURFACE AB SER QL IA: POSITIVE
HBV SURFACE AB SERPL IA-ACNC: 90.36 MIU/ML
HBV SURFACE AG SER QL: <0.1 INDEX
HBV SURFACE AG SER QL: NEGATIVE
HEP BS AB COMMENT,HBSAC: NORMAL
HIV 1+2 AB+HIV1 P24 AG SERPL QL IA: NONREACTIVE
HIV12 RESULT COMMENT, HHIVC: NORMAL

## 2019-04-22 DIAGNOSIS — B18.2 CHRONIC HEPATITIS C WITHOUT HEPATIC COMA (HCC): Primary | ICD-10-CM

## 2022-03-19 PROBLEM — B18.2 CHRONIC HEPATITIS C (HCC): Status: ACTIVE | Noted: 2018-12-17

## 2022-03-19 PROBLEM — J93.83 SPONTANEOUS PNEUMOTHORAX: Status: ACTIVE | Noted: 2018-12-17

## 2022-03-19 PROBLEM — Z90.2 H/O PNEUMONECTOMY: Status: ACTIVE | Noted: 2018-12-17

## 2022-03-19 PROBLEM — K21.9 GERD (GASTROESOPHAGEAL REFLUX DISEASE): Status: ACTIVE | Noted: 2018-12-17

## 2022-03-19 PROBLEM — Z98.890 H/O PNEUMONECTOMY: Status: ACTIVE | Noted: 2018-12-17

## 2022-03-20 PROBLEM — I21.9 MYOCARDIAL INFARCTION (HCC): Status: ACTIVE | Noted: 2018-12-17

## 2023-06-19 ENCOUNTER — TRANSCRIBE ORDERS (OUTPATIENT)
Facility: HOSPITAL | Age: 58
End: 2023-06-19

## 2023-06-19 DIAGNOSIS — R29.898 LEG WEAKNESS, BILATERAL: Primary | ICD-10-CM

## 2023-06-22 ENCOUNTER — TELEPHONE (OUTPATIENT)
Age: 58
End: 2023-06-22

## 2024-04-26 ENCOUNTER — TELEPHONE (OUTPATIENT)
Age: 59
End: 2024-04-26

## 2024-04-26 NOTE — TELEPHONE ENCOUNTER
Referral for screening colonoscopy and GERD. Please review and advise.      Referral  Referral # [id number]  Referral Information    Referral # Creation Date Referral Status Status Update    [id number] 04/25/2024 Open 04/25/2024: Status History     Status Reason Referral Type Referral Reasons Referral Class   none Eval and Treat none Incoming     To Specialty To Provider To Location/Place of Service To Department   none Dmitriy Frederick MD none none     To Vendor Referred By By Location/Place of Service By Department   none Geraldo Goode MD none none     Priority Start Date Expiration Date Referral Entered By   Routine 04/16/2024 04/16/2025 Lary Hensley LPN     Visits Requested Visits Authorized Visits Completed Visits Scheduled   2 2       Coverages    Payor Plan Auth. Required? Covered? Member # Authorized From Expires Auth # Precert. # Comment   [employer] BETTER [facility] [employer]ET[employer] BETTER [facility] -- Covered [id number] 3/1/2022 -- -- -- --     Referral Information    Referral # Creation Date Referral Status Status Update    [id number] 04/25/2024 Open 04/25/2024: Status History     Status Reason Referral Type Referral Reasons Referral Class   none Eval and Treat none Incoming     To Specialty To Provider To Location/Place of Service To Department   none Dmitriy Frederick MD none none     To Vendor Referred By By Location/Place of Service By Department   none Geraldo Goode MD none none     Priority Start Date Expiration Date Referral Entered By   Routine 04/16/2024 04/16/2025 Lary Hensley LPN     Visits Requested Visits Authorized Visits Completed Visits Scheduled   2 2       Procedure Information    Service Details  Procedure Modifiers Provider Requested Approved   REF25 - AMB REFERRAL TO GASTROENTEROLOGY none Dmitriy Frederick MD 2 2     Diagnosis Information    Diagnosis   Z12.11 (ICD-10-CM) - Encounter for screening for malignant neoplasm of colon   K21.9 (ICD-10-CM) - Gastro-esophageal

## 2024-12-12 ENCOUNTER — OFFICE VISIT (OUTPATIENT)
Facility: CLINIC | Age: 59
End: 2024-12-12

## 2024-12-12 VITALS
DIASTOLIC BLOOD PRESSURE: 47 MMHG | TEMPERATURE: 99.1 F | SYSTOLIC BLOOD PRESSURE: 78 MMHG | OXYGEN SATURATION: 97 % | BODY MASS INDEX: 26.02 KG/M2 | HEART RATE: 76 BPM | HEIGHT: 65 IN | RESPIRATION RATE: 15 BRPM | WEIGHT: 156.2 LBS

## 2024-12-12 DIAGNOSIS — I95.9 HYPOTENSION, UNSPECIFIED HYPOTENSION TYPE: ICD-10-CM

## 2024-12-12 DIAGNOSIS — Z13.220 SCREENING CHOLESTEROL LEVEL: ICD-10-CM

## 2024-12-12 DIAGNOSIS — Z13.1 ENCOUNTER FOR SCREENING FOR DIABETES MELLITUS: ICD-10-CM

## 2024-12-12 DIAGNOSIS — R07.2 PRECORDIAL PAIN: ICD-10-CM

## 2024-12-12 DIAGNOSIS — Z11.59 ENCOUNTER FOR HEPATITIS C SCREENING TEST FOR LOW RISK PATIENT: ICD-10-CM

## 2024-12-12 DIAGNOSIS — B18.2 CHRONIC HEPATITIS C WITHOUT HEPATIC COMA (HCC): ICD-10-CM

## 2024-12-12 DIAGNOSIS — Z76.89 ENCOUNTER TO ESTABLISH CARE WITH NEW DOCTOR: Primary | ICD-10-CM

## 2024-12-12 DIAGNOSIS — Z11.4 ENCOUNTER FOR SCREENING FOR HIV: ICD-10-CM

## 2024-12-12 DIAGNOSIS — Z13.6 ENCOUNTER FOR SCREENING FOR CORONARY ARTERY DISEASE: ICD-10-CM

## 2024-12-12 PROBLEM — Z87.898 HISTORY OF PREDIABETES: Chronic | Status: ACTIVE | Noted: 2017-01-04

## 2024-12-12 PROBLEM — R76.8 SEROPOSITIVE FOR HERPES SIMPLEX 2 INFECTION: Chronic | Status: ACTIVE | Noted: 2017-01-04

## 2024-12-12 PROBLEM — I42.9 CARDIOMYOPATHY (HCC): Status: ACTIVE | Noted: 2017-01-23

## 2024-12-12 PROBLEM — E78.5 HYPERLIPIDEMIA: Chronic | Status: ACTIVE | Noted: 2017-01-04

## 2024-12-12 PROBLEM — Z59.00 HOMELESSNESS: Chronic | Status: ACTIVE | Noted: 2017-01-04

## 2024-12-12 PROBLEM — A53.0 POSITIVE SEROLOGY FOR SYPHILIS: Chronic | Status: ACTIVE | Noted: 2017-01-04

## 2024-12-12 PROBLEM — F14.91 HISTORY OF COCAINE USE: Chronic | Status: ACTIVE | Noted: 2017-01-04

## 2024-12-12 PROBLEM — G62.9 NEUROPATHY: Chronic | Status: ACTIVE | Noted: 2017-01-04

## 2024-12-12 SDOH — ECONOMIC STABILITY: FOOD INSECURITY: WITHIN THE PAST 12 MONTHS, THE FOOD YOU BOUGHT JUST DIDN'T LAST AND YOU DIDN'T HAVE MONEY TO GET MORE.: OFTEN TRUE

## 2024-12-12 SDOH — ECONOMIC STABILITY: INCOME INSECURITY: HOW HARD IS IT FOR YOU TO PAY FOR THE VERY BASICS LIKE FOOD, HOUSING, MEDICAL CARE, AND HEATING?: HARD

## 2024-12-12 SDOH — ECONOMIC STABILITY: FOOD INSECURITY: WITHIN THE PAST 12 MONTHS, YOU WORRIED THAT YOUR FOOD WOULD RUN OUT BEFORE YOU GOT MONEY TO BUY MORE.: OFTEN TRUE

## 2024-12-12 ASSESSMENT — PATIENT HEALTH QUESTIONNAIRE - PHQ9
1. LITTLE INTEREST OR PLEASURE IN DOING THINGS: NEARLY EVERY DAY
SUM OF ALL RESPONSES TO PHQ9 QUESTIONS 1 & 2: 6
8. MOVING OR SPEAKING SO SLOWLY THAT OTHER PEOPLE COULD HAVE NOTICED. OR THE OPPOSITE, BEING SO FIGETY OR RESTLESS THAT YOU HAVE BEEN MOVING AROUND A LOT MORE THAN USUAL: NEARLY EVERY DAY
2. FEELING DOWN, DEPRESSED OR HOPELESS: NEARLY EVERY DAY
4. FEELING TIRED OR HAVING LITTLE ENERGY: NEARLY EVERY DAY
3. TROUBLE FALLING OR STAYING ASLEEP: NEARLY EVERY DAY
SUM OF ALL RESPONSES TO PHQ QUESTIONS 1-9: 24
7. TROUBLE CONCENTRATING ON THINGS, SUCH AS READING THE NEWSPAPER OR WATCHING TELEVISION: NEARLY EVERY DAY
SUM OF ALL RESPONSES TO PHQ QUESTIONS 1-9: 25
5. POOR APPETITE OR OVEREATING: NEARLY EVERY DAY
SUM OF ALL RESPONSES TO PHQ QUESTIONS 1-9: 25
10. IF YOU CHECKED OFF ANY PROBLEMS, HOW DIFFICULT HAVE THESE PROBLEMS MADE IT FOR YOU TO DO YOUR WORK, TAKE CARE OF THINGS AT HOME, OR GET ALONG WITH OTHER PEOPLE: SOMEWHAT DIFFICULT
SUM OF ALL RESPONSES TO PHQ QUESTIONS 1-9: 25
6. FEELING BAD ABOUT YOURSELF - OR THAT YOU ARE A FAILURE OR HAVE LET YOURSELF OR YOUR FAMILY DOWN: NEARLY EVERY DAY
9. THOUGHTS THAT YOU WOULD BE BETTER OFF DEAD, OR OF HURTING YOURSELF: SEVERAL DAYS

## 2024-12-12 ASSESSMENT — COLUMBIA-SUICIDE SEVERITY RATING SCALE - C-SSRS
2. HAVE YOU ACTUALLY HAD ANY THOUGHTS OF KILLING YOURSELF?: NO
6. HAVE YOU EVER DONE ANYTHING, STARTED TO DO ANYTHING, OR PREPARED TO DO ANYTHING TO END YOUR LIFE?: NO
1. WITHIN THE PAST MONTH, HAVE YOU WISHED YOU WERE DEAD OR WISHED YOU COULD GO TO SLEEP AND NOT WAKE UP?: NO

## 2024-12-12 NOTE — PROGRESS NOTES
History of Present Illness  Rodney Harrington is a 59 y.o. male who presents today for management of    Chief Complaint   Patient presents with    New Patient     CC: new patient here for healthcare maintenance    -Patient is here to establish care.   -Previous PCP: no  -Employment: disabled (one lung, ortho issues)  -He is homeless with his finace     Medical History:  Hypotension  - no HTN meds x 18 months  - reports not feeling normal, SOB, lightheadeness, CP x days  - not sure about palpitations    Leg pain  - locks up when walking  - has fallen    Social  - EtOH: occasion  - Tobacco: 1/2 pack per week  - Illicit drugs: occasional cocaine    Healthcare maintenance, patient reported:  Vaccine records requested from previous PCP/pharmacy if available  Last Colonoscopy: 2022 @ [facility]       Social Determinants of Health     Tobacco Use: High Risk (12/12/2024)    Patient History     Smoking Tobacco Use: Every Day     Smokeless Tobacco Use: Never     Passive Exposure: Current   Alcohol Use: Unknown (9/26/2022)    [facility]    AUDIT-C     Frequency of Alcohol Consumption: 2-4 times a month     Average Number of Drinks: Not on file     Frequency of Binge Drinking: Not on file   Financial Resource Strain: High Risk (12/12/2024)    Overall Financial Resource Strain (CARDIA)     Difficulty of Paying Living Expenses: Hard   Food Insecurity: Food Insecurity Present (12/12/2024)    Hunger Vital Sign     Worried About Running Out of Food in the Last Year: Often true     Ran Out of Food in the Last Year: Often true   Transportation Needs: Unmet Transportation Needs (12/12/2024)    PRAPARE - Transportation     Lack of Transportation (Medical): Not on file     Lack of Transportation (Non-Medical): Yes   Physical Activity: Not on file   Stress: Not on file   Social Connections: Not on file   Intimate Partner Violence: Not on file   Depression: At risk (12/12/2024)    PHQ-2     PHQ-2 Score: 25

## 2024-12-12 NOTE — PATIENT INSTRUCTIONS
You need to make two appointments. A lab appoitnment and an appointment to see me a week after that.    It was nice meeting you today.  Please have your labs done either immediately after this visit, or you can schedule at the  to come back for labs.  Please do your labs at least a week before your next appointment.    If you have questions or concerns, My Chart is the fastest way to get in touch with me and the clinical staff.    Keep in mind that [facility] policy schedules most appointments to last 15 minutes. If you have a new problem or multiple problems you can request 30 minutes.  If you arrive more than [facility]itentiary through your scheduled appointment, staff may offer to reschedule you. This is to ensure you and the provider have enough time to complete a high quality encounter.  Please arrive to your appointments at least 10 minutes early to avoid any unforseen delays.     If you have trouble paying medical bills, please consider contacting "[employer].  [employer] patient representatives are available to discuss government programs that provide assistance with medical bills to qualifying individuals and their families.  The services are provided free of charge to patients in need of assistance.  [employer] patient representatives can also assist you in completing and registering applications with appropriate agencies.  Please call the following number if you are interested: [phone].    [facility] Financial Resources*  (Call [employer]/211 if need more resources.)       Medical  [facility] Financial Assistance  What they offer: [facility] Financial Assistance Program helps uninsured patients who do not qualify for government-sponsored health insurance and cannot afford to pay for their medical care. Insured patient may also qualify for assistance based on family income, family size, and medical needs.   Phone Number: [phone]  How to apply for the [employer] Financial Assistance

## 2024-12-12 NOTE — ASSESSMENT & PLAN NOTE
- sustained symptomatic hypotension despite PO fluids and repeat BP measurements  - exam negative  - concern for MI, PE, arrhythmia, occult infection  - EMS contacted for transport to ED for eval

## 2024-12-12 NOTE — PROGRESS NOTES
Rodney Harrington is a 59 y.o. year old male who presents today for   Chief Complaint   Patient presents with    New Patient        \"Have you been to the ER, urgent care clinic since your last visit?  Hospitalized since your last visit?\"   no      “Have you seen or consulted any other health care providers outside our system since your last visit?”   NO       “Have you had a colorectal cancer screening such as a colonoscopy/FIT/Cologuard?    YES - Type: Colonoscopy - Where: [facility] [place]     No colonoscopy on file  No cologuard on file  No FIT/FOBT on file   No flexible sigmoidoscopy on file           Kinga Garcia [facility]  [facility]  Ph: [phone]  Fax: [phone]

## 2024-12-26 ENCOUNTER — TELEPHONE (OUTPATIENT)
Facility: CLINIC | Age: 59
End: 2024-12-26

## 2024-12-26 NOTE — TELEPHONE ENCOUNTER
Patricia called in w/ Dr. Dmitriy Frederick's office ([facility]) wanting to let the nurse know they are also a [place] practice. She's asking instead of marking the referral as outgoing & faxing the referral over, please just place the referral as internal. It'll fall in to a workque that is processed daily

## 2024-12-26 NOTE — TELEPHONE ENCOUNTER
Received fax from [facility] stated they have been trying to reach patient to schedule for imaging scans please advise patient is he calls there contact information phone:[phone] available Monday -Friday 8 am-5:30 pm.

## 2024-12-30 DIAGNOSIS — B18.2 CHRONIC HEPATITIS C WITHOUT HEPATIC COMA (HCC): Primary | ICD-10-CM

## 2024-12-30 NOTE — PROGRESS NOTES
Pt needs to be referred to GI in [place] for his hep C.    Also, he never returned for his FU visit. Pleasee call to schedule. Thanks!

## 2025-03-11 ENCOUNTER — TELEPHONE (OUTPATIENT)
Facility: CLINIC | Age: 60
End: 2025-03-11

## 2025-03-11 NOTE — TELEPHONE ENCOUNTER
Attempted to reach out to pt several times got voicemail customer has caller restriction and you are unable to  leave message      If pt calls back pls reschedule      Thank you

## 2025-05-15 ENCOUNTER — TELEPHONE (OUTPATIENT)
Facility: CLINIC | Age: 60
End: 2025-05-15

## 2025-05-15 NOTE — TELEPHONE ENCOUNTER
Reached out to pt however the number we have  on file is no longer in service        Please advise      Thank you